# Patient Record
Sex: MALE | Race: WHITE | NOT HISPANIC OR LATINO | Employment: FULL TIME | URBAN - METROPOLITAN AREA
[De-identification: names, ages, dates, MRNs, and addresses within clinical notes are randomized per-mention and may not be internally consistent; named-entity substitution may affect disease eponyms.]

---

## 2018-06-04 ENCOUNTER — APPOINTMENT (OUTPATIENT)
Dept: PHYSICAL THERAPY | Facility: CLINIC | Age: 58
End: 2018-06-04
Payer: COMMERCIAL

## 2018-06-27 ENCOUNTER — OFFICE VISIT (OUTPATIENT)
Dept: PHYSICAL THERAPY | Facility: CLINIC | Age: 58
End: 2018-06-27
Payer: COMMERCIAL

## 2018-06-27 DIAGNOSIS — M25.511 RIGHT SHOULDER PAIN, UNSPECIFIED CHRONICITY: Primary | ICD-10-CM

## 2018-06-27 PROCEDURE — 97140 MANUAL THERAPY 1/> REGIONS: CPT | Performed by: PHYSICAL THERAPIST

## 2018-06-27 PROCEDURE — 97110 THERAPEUTIC EXERCISES: CPT | Performed by: PHYSICAL THERAPIST

## 2018-06-27 NOTE — PROGRESS NOTES
Daily Note     Today's date: 2018  Patient name: Rober Sparks  : 1960  MRN: [id number]  Referring provider: No ref  provider found  Dx:   Encounter Diagnosis     ICD-10-CM    1  Right shoulder pain, unspecified chronicity M25 511                   Subjective:  Tight at times but definitely better  5/10 pain greater posteriorly especially with outward reaching  Objective: See treatment diary below      Assessment: Tolerated treatment well  Patient would benefit from continued PT      Plan: Continue per plan of care       Precautions: none    Daily Treatment Diary     Manual              Manual with joint mobs 15'                                                                    Exercise Diary              Ball closed chain FF/CW/CCW 30x each            Wand ER 10"/10            Cybex EX             UBE 6'            Pulleys FF/ABD/IR 30x            Row Deltoid 45/30            bicep 45/30            tricep 60/30            Ball isometrics 30x                                                                                                                                                               Modalities              MH/Estim right shoulder 10'

## 2018-06-29 ENCOUNTER — APPOINTMENT (OUTPATIENT)
Dept: PHYSICAL THERAPY | Facility: CLINIC | Age: 58
End: 2018-06-29
Payer: COMMERCIAL

## 2018-07-02 ENCOUNTER — APPOINTMENT (OUTPATIENT)
Dept: PHYSICAL THERAPY | Facility: CLINIC | Age: 58
End: 2018-07-02
Payer: COMMERCIAL

## 2018-07-06 ENCOUNTER — OFFICE VISIT (OUTPATIENT)
Dept: PHYSICAL THERAPY | Facility: CLINIC | Age: 58
End: 2018-07-06
Payer: COMMERCIAL

## 2018-07-06 DIAGNOSIS — M25.511 RIGHT SHOULDER PAIN, UNSPECIFIED CHRONICITY: Primary | ICD-10-CM

## 2018-07-06 PROCEDURE — 97110 THERAPEUTIC EXERCISES: CPT

## 2018-07-06 PROCEDURE — 97140 MANUAL THERAPY 1/> REGIONS: CPT

## 2018-07-06 NOTE — PROGRESS NOTES
Daily Note     Today's date: 2018  Patient name: Caleb Sparks  : 1960  MRN: [id number]  Referring provider: Lucia Kimball MD  Dx:   Encounter Diagnosis     ICD-10-CM    1  Right shoulder pain, unspecified chronicity M25 511        Start Time: 705  Stop Time: 805  Total time in clinic (min): 60 minutes    Subjective: Patient reports having increased pain in the right shouldder  conts to have tightness at times, notes going for [facility] tomorrow  Objective: See treatment diary below      Assessment: Tolerated treatment well  Patient exhibited good technique with therapeutic exercises and would benefit from continued PT      Plan: Continue per plan of care  Progress note during next visit       Precautions: none    Daily Treatment Diary     Manual             Manual with joint mobs 15' 15                                                                   Exercise Diary             Ball closed chain FF/CW/CCW 30x each 30 each           Wand ER 10"/10 10"/10           Cybex EX             UBE            Pulleys FF/ABD/IR 30x 30x           Row Deltoid 45/30 45/30           bicep 45/30 45/30           tricep 60/30 60/30           Ball isometrics 30x 30x each                                                                                                                                                              Modalities             MH/Estim right shoulder 10

## 2018-07-08 NOTE — PROGRESS NOTES
PT Re-Evaluation     Today's date: 2018  Patient name: Felisa Chanel  : 1960  MRN: [id number]  Referring provider: Darryl Gunn MD  Dx:   Encounter Diagnosis     ICD-10-CM    1  Right shoulder pain, unspecified chronicity M25 511                   Assessment  Impairments: abnormal or restricted ROM, impaired physical strength and pain with function    Assessment details: Viridiana Inman has been seen in PT for 10 treatments over the course of two months  He has made positive gains with decreased pain intensity and frequency as well as increased ROM and functional mobility  Partial achievement of goals met  He could benefit from additional PT services to address above impairments and functional limitations  Understanding of Dx/Px/POC: good   Prognosis: good    Goals  STG/  1  < 5/10 pain in 3 weeks - progressing toward this goal  2  ROM: FF>150, ABD>150, ER>70,IR>45- progressing toward  LTG  1 3-4/5 strength in available ROM in 6 weeks - progressing  2  Sleep uninterupted by shoulder pain for >5 hours in 6 weeeks - progressing toward  3  Increased CARROLL shoulder 9 points in 6 weeks  - progressing toward   Continue with goals as outlined with projected goals to be met in 4-6 weeks        Plan  Patient would benefit from: skilled physical therapy  Planned modality interventions: thermotherapy: hydrocollator packs and unattended electrical stimulation  Planned therapy interventions: manual therapy, joint mobilization, neuromuscular re-education, patient education, strengthening, stretching, therapeutic activities, therapeutic exercise and home exercise program  Duration in visits: 2  Duration in weeks: 6  Plan of Care beginning date: 2018  Plan of Care expiration date: 2018  Treatment plan discussed with: patient        Subjective Evaluation    History of Present Illness  Mechanism of injury: Patient notes positive gains with PT to date with increased ROM, decreased pain, and increased use of right upper extremity  Had MRI this weekend and will see MD in four days for review of results  Quality of life: good    Pain  Current pain ratin  At best pain ratin  At worst pain ratin  Pain location: generalized right shoulder and upper arm  Quality: discomfort and dull ache  Relieving factors: change in position, heat and ice  Exacerbated by: sleeping  Progression: improved    Patient Goals  Patient goals for therapy: increased strength, independence with ADLs/IADLs, return to sport/leisure activities and increased motion          Objective     Active Range of Motion     Right Shoulder   Flexion: 135 degrees   Abduction: 125 degrees   External rotation 45°: 45 degrees   Internal rotation 45°: 45 degrees with pain    Passive Range of Motion     Right Shoulder   Flexion: 158 degrees   Abduction: 140 degrees   External rotation 90°: 55 degrees   Internal rotation 90°: 45 degrees     Scapular Mobility     Right Shoulder   Scapular mobility: fair    Strength/Myotome Testing     Right Shoulder     Planes of Motion   Flexion: 4-   Abduction: 4-   External rotation at 0°: 3+   Internal rotation at 0°: 4-     Tests     Additional Tests Details  [place] Shoulder score:48/60   Was 45/64  + gains in functional mobility      Flowsheet Rows      Most Recent Value   PT/OT G-Codes   Assessment Type  Re-evaluation   G code set  Carrying, Moving & Handling Objects   Carrying, Moving and Handling Objects Current Status ()  CJ   Carrying, Moving and Handling Objects Goal Status ()  CI          Precautions: standard    Daily Treatment Diary     Manual            Manual with joint mobs 15' 15 15                       b                                           Exercise Diary            Ball closed chain FF/CW/CCW 30x each 30 each 30x          Wand ER 10"/10 10"/10 nt          Cybex EX             UBE 6' 6' 6'          Pulleys FF/ABD/IR 30x 30x 30x          Row Deltoid 45/30 45/30 45/30          bicep 45/30 45/30 45/30          tricep 60/30 60/30 60/30          Ball isometrics 30x 30x each D/C          RTC exercise   Blue  30x each                                                                                                                                                Modalities  6/27 7/6           MH/Estim right shoulder 10'

## 2018-07-09 ENCOUNTER — APPOINTMENT (OUTPATIENT)
Dept: PHYSICAL THERAPY | Facility: CLINIC | Age: 58
End: 2018-07-09
Payer: COMMERCIAL

## 2018-07-09 ENCOUNTER — TRANSCRIBE ORDERS (OUTPATIENT)
Dept: PHYSICAL THERAPY | Facility: CLINIC | Age: 58
End: 2018-07-09

## 2018-07-09 ENCOUNTER — EVALUATION (OUTPATIENT)
Dept: PHYSICAL THERAPY | Facility: CLINIC | Age: 58
End: 2018-07-09
Payer: COMMERCIAL

## 2018-07-09 DIAGNOSIS — M25.511 RIGHT SHOULDER PAIN, UNSPECIFIED CHRONICITY: Primary | ICD-10-CM

## 2018-07-09 PROCEDURE — 97110 THERAPEUTIC EXERCISES: CPT | Performed by: PHYSICAL THERAPIST

## 2018-07-09 PROCEDURE — G8985 CARRY GOAL STATUS: HCPCS | Performed by: PHYSICAL THERAPIST

## 2018-07-09 PROCEDURE — G8984 CARRY CURRENT STATUS: HCPCS | Performed by: PHYSICAL THERAPIST

## 2018-07-09 PROCEDURE — 97140 MANUAL THERAPY 1/> REGIONS: CPT | Performed by: PHYSICAL THERAPIST

## 2018-07-09 NOTE — LETTER
September 10, 2018    Dayana Larson MD  [address] [place] [address]    Patient: Julian Sparks   YOB: 1960   Date of Visit: 2018     Encounter Diagnosis     ICD-10-CM    1  Right shoulder pain, unspecified chronicity M25 511        Dear Dr Lucero Chavez:    Please review the attached Plan of Care from [facility] recent visit  Please verify that you agree therapy should continue by signing the attached document and sending it back to our office  If you have any questions or concerns, please don't hesitate to call  Sincerely,    Barbara Siddiqi, PT      Referring Provider:      I certify that I have read the below Plan of Care and certify the need for these services furnished under this plan of treatment while under my care  Dayana Larson MD  [address] [facility]: [phone]          PT Re-Evaluation     Today's date: 2018  Patient name: Danita Torres  : 1960  MRN: [id number]  Referring provider: Cuba Ambriz MD  Dx:   Encounter Diagnosis     ICD-10-CM    1  Right shoulder pain, unspecified chronicity M25 511                   Assessment  Impairments: abnormal or restricted ROM, impaired physical strength and pain with function    Assessment details: Khushi Fuller has been seen in PT for 10 treatments over the course of two months  He has made positive gains with decreased pain intensity and frequency as well as increased ROM and functional mobility  Partial achievement of goals met  He could benefit from additional PT services to address above impairments and functional limitations  Understanding of Dx/Px/POC: good   Prognosis: good    Goals  STG/  1  < 5/10 pain in 3 weeks - progressing toward this goal  2  ROM: FF>150, ABD>150, ER>70,IR>45- progressing toward  LTG  1 3-4/5 strength in available ROM in 6 weeks - progressing  2   Sleep uninterupted by shoulder pain for >5 hours in 6 weeeks - progressing toward  3  Increased CARROLL shoulder 9 points in 6 weeks  - progressing toward   Continue with goals as outlined with projected goals to be met in 4-6 weeks  Plan  Patient would benefit from: skilled physical therapy  Planned modality interventions: thermotherapy: hydrocollator packs and unattended electrical stimulation  Planned therapy interventions: manual therapy, joint mobilization, neuromuscular re-education, patient education, strengthening, stretching, therapeutic activities, therapeutic exercise and home exercise program  Duration in visits: 2  Duration in weeks: 6  Plan of Care beginning date: 2018  Plan of Care expiration date: 2018  Treatment plan discussed with: patient        Subjective Evaluation    History of Present Illness  Mechanism of injury: Patient notes positive gains with PT to date with increased ROM, decreased pain, and increased use of right upper extremity  Had MRI this weekend and will see MD in four days for review of results  Quality of life: good    Pain  Current pain ratin  At best pain ratin  At worst pain ratin  Pain location: generalized right shoulder and upper arm  Quality: discomfort and dull ache  Relieving factors: change in position, heat and ice  Exacerbated by: sleeping    Progression: improved    Patient Goals  Patient goals for therapy: increased strength, independence with ADLs/IADLs, return to sport/leisure activities and increased motion          Objective     Active Range of Motion     Right Shoulder   Flexion: 135 degrees   Abduction: 125 degrees   External rotation 45°:  45 degrees   Internal rotation 45°:  45 degrees with pain    Passive Range of Motion     Right Shoulder   Flexion: 158 degrees   Abduction: 140 degrees   External rotation 90°:  55 degrees   Internal rotation 90°:  45 degrees     Scapular Mobility     Right Shoulder   Scapular mobility: fair    Strength/Myotome Testing     Right Shoulder     Planes of Motion   Flexion: 4- Abduction: 4-   External rotation at 0°:  3+   Internal rotation at 0°:  4-     Tests     Additional Tests Details  [place] Shoulder score:48/60   Was 45/64  + gains in functional mobility      Flowsheet Rows      Most Recent Value   PT/OT G-Codes   Assessment Type  Re-evaluation   G code set  Carrying, Moving & Handling Objects   Carrying, Moving and Handling Objects Current Status ()  CJ   Carrying, Moving and Handling Objects Goal Status ()  CI          Precautions: standard    Daily Treatment Diary     Manual  6/27 7/6 7/9          Manual with joint mobs 15' 15 15                       b                                           Exercise Diary  6/27 7/6 7/9          Ball closed chain FF/CW/CCW 30x each 30 each 30x          Wand ER 10"/10 10"/10 nt          Cybex EX             UBE 6' 6' 6'          Pulleys FF/ABD/IR 30x 30x 30x          Row Deltoid 45/30 45/30 45/30          bicep 45/30 45/30 45/30          tricep 60/30 60/30 60/30          Ball isometrics 30x 30x each D/C          RTC exercise   Blue  30x each                                                                                                                                                Modalities  6/27 7/6           MH/Estim right shoulder 10'

## 2018-07-09 NOTE — LETTER
2018    MD Lizzie Ag [address]  [address] [facility] [address]    Patient: Lorelei Sparks   YOB: 1960   Date of Visit: 2018     Encounter Diagnosis     ICD-10-CM    1  Right shoulder pain, unspecified chronicity M25 511        Dear Dr Braden Posada:    Please review the attached Plan of Care from [facility] recent visit  Please verify that you agree therapy should continue by signing the attached document and sending it back to our office  If you have any questions or concerns, please don't hesitate to call  Sincerely,    Maye Apgar, PT      Referring Provider:      I certify that I have read the below Plan of Care and certify the need for these services furnished under this plan of treatment while under my care  MD Lizzie [address]  [facility] [place]: [phone]          PT Re-Evaluation     Today's date: 2018  Patient name: Collette Lords  : 1960  MRN: [id number]  Referring provider: [place]ia Alegria MD  Dx:   Encounter Diagnosis     ICD-10-CM    1  Right shoulder pain, unspecified chronicity M25 511                   Assessment  Impairments: abnormal or restricted ROM, impaired physical strength and pain with function    Assessment details: Travon Mathur has been seen in PT for 10 treatments over the course of two months  He has made positive gains with decreased pain intensity and frequency as well as increased ROM and functional mobility  Partial achievement of goals met  He could benefit from additional PT services to address above impairments and functional limitations  Understanding of Dx/Px/POC: good   Prognosis: good    Goals  STG/  1  < 5/10 pain in 3 weeks - progressing toward this goal  2  ROM: FF>150, ABD>150, ER>70,IR>45- progressing toward  LTG  1 3-4/5 strength in available ROM in 6 weeks - progressing  2   Sleep uninterupted by shoulder pain for >5 hours in 6 weeeks - progressing toward  3  Increased CARROLL shoulder 9 points in 6 weeks  - progressing toward   Continue with goals as outlined with projected goals to be met in 4-6 weeks  Plan  Patient would benefit from: skilled physical therapy  Planned modality interventions: thermotherapy: hydrocollator packs and unattended electrical stimulation  Planned therapy interventions: manual therapy, joint mobilization, neuromuscular re-education, patient education, strengthening, stretching, therapeutic activities, therapeutic exercise and home exercise program  Duration in visits: 2  Duration in weeks: 6  Plan of Care beginning date: 2018  Plan of Care expiration date: 2018  Treatment plan discussed with: patient        Subjective Evaluation    History of Present Illness  Mechanism of injury: Patient notes positive gains with PT to date with increased ROM, decreased pain, and increased use of right upper extremity  Had MRI this weekend and will see MD in four days for review of results  Quality of life: good    Pain  Current pain ratin  At best pain ratin  At worst pain ratin  Pain location: generalized right shoulder and upper arm  Quality: discomfort and dull ache  Relieving factors: change in position, heat and ice  Exacerbated by: sleeping    Progression: improved    Patient Goals  Patient goals for therapy: increased strength, independence with ADLs/IADLs, return to sport/leisure activities and increased motion          Objective     Active Range of Motion     Right Shoulder   Flexion: 135 degrees   Abduction: 125 degrees   External rotation 45°:  45 degrees   Internal rotation 45°:  45 degrees with pain    Passive Range of Motion     Right Shoulder   Flexion: 158 degrees   Abduction: 140 degrees   External rotation 90°:  55 degrees   Internal rotation 90°:  45 degrees     Scapular Mobility     Right Shoulder   Scapular mobility: fair    Strength/Myotome Testing     Right Shoulder     Planes of Motion   Flexion: 4- Abduction: 4-   External rotation at 0°:  3+   Internal rotation at 0°:  4-     Tests     Additional Tests Details  Shakir Shoulder score:48/60   Was 45/64  + gains in functional mobility      Flowsheet Rows      Most Recent Value   PT/OT G-Codes   Assessment Type  Re-evaluation   G code set  Carrying, Moving & Handling Objects   Carrying, Moving and Handling Objects Current Status ()  CJ   Carrying, Moving and Handling Objects Goal Status ()  CI          Precautions: standard    Daily Treatment Diary     Manual  6/27 7/6 7/9          Manual with joint mobs 15' 15 15                       b                                           Exercise Diary  6/27 7/6 7/9          Ball closed chain FF/CW/CCW 30x each 30 each 30x          Wand ER 10"/10 10"/10 nt          Cybex EX             UBE 6' 6' 6'          Pulleys FF/ABD/IR 30x 30x 30x          Row Deltoid 45/30 45/30 45/30          bicep 45/30 45/30 45/30          tricep 60/30 60/30 60/30          Ball isometrics 30x 30x each D/C          RTC exercise   Blue  30x each                                                                                                                                                Modalities  6/27 7/6           MH/Estim right shoulder 10'

## 2018-07-13 ENCOUNTER — APPOINTMENT (OUTPATIENT)
Dept: PHYSICAL THERAPY | Facility: CLINIC | Age: 58
End: 2018-07-13
Payer: COMMERCIAL

## 2018-07-13 ENCOUNTER — OFFICE VISIT (OUTPATIENT)
Dept: PHYSICAL THERAPY | Facility: CLINIC | Age: 58
End: 2018-07-13
Payer: COMMERCIAL

## 2018-07-13 DIAGNOSIS — M25.511 RIGHT SHOULDER PAIN, UNSPECIFIED CHRONICITY: Primary | ICD-10-CM

## 2018-07-13 PROCEDURE — 97110 THERAPEUTIC EXERCISES: CPT

## 2018-07-13 PROCEDURE — 97014 ELECTRIC STIMULATION THERAPY: CPT

## 2018-07-13 PROCEDURE — G0283 ELEC STIM OTHER THAN WOUND: HCPCS

## 2018-07-13 PROCEDURE — 97140 MANUAL THERAPY 1/> REGIONS: CPT

## 2018-07-13 NOTE — PROGRESS NOTES
Daily Note     Today's date: 2018  Patient name: Franko Yates  : 1960  MRN: [id number]  Referring provider: Tessa Nielsen MD  Dx:   Encounter Diagnosis     ICD-10-CM    1  Right shoulder pain, unspecified chronicity M25 511        Start Time: 705  Stop Time: 805  Total time in clinic (min): 60 minutes    Subjective: patient reports a little sore last night and today due increased activities at home building a shed  Notes he sees MD later today  Objective: See treatment diary below      Assessment: Tolerated treatment well, still pain and tightness right shoulder all planes luli FF/ER Patient would benefit from continued PT      Plan: Continue per plan of care  Progress treatment as tolerated          Precautions: standard    Daily Treatment Diary     Manual           Manual with joint mobs 15' 15 15 15                      b                                           Exercise Diary           Ball closed chain FF/CW/CCW 30x each 30 each 30x 30         Wand ER 10"/10 10"/10 nt 10"/10         Cybex EX             UBE 6 6' 6         Pulleys FF/ABD/IR 30x 30x 30x 30         Row Deltoid 45/30 45/30 45/30 45/30         bicep 45/30 45/30 45/30 45/30         tricep 60/30 60/30 60/30 60/30         Ball isometrics 30x 30x each D/C          RTC exercise   Blue  30x each 30x each                                                                                                                                               Modalities           MH/Estim right shoulder 10  10 10

## 2018-07-15 NOTE — PROGRESS NOTES
Daily Note     Today's date: 7/15/2018  Patient name: Li Richardson  : 1960  MRN: [id number]  Referring provider: Estrada Ricketts MD  Dx:   Encounter Diagnosis     ICD-10-CM    1  Right shoulder pain, unspecified chronicity M25 511                   Subjective: Patient notes he was able to complete a home project of building a shed this weekend with no significant increase in shoulder pain  Pain 3-4/10 today  Saw MD Friday who advised continue PT  Patient notes MRI was positive for a small RTC tear but surgery not warranted at this time per patient  Objective: See treatment diary below      Assessment: Tolerated treatment well  Patient would benefit from continued PT  Positive scapular tightness apparent at end ranges of flexion and abduction but improving  Pain end range external rotation  Plan: Continue per plan of care      Precautions: standard    Daily Treatment Diary     Manual          Manual with joint mobs 15' 15 15 15 15'                                                                Exercise Diary          Ball closed chain FF/CW/CCW 30x each 30 each 30x 30 30x        Wand ER 10"/10 10"/10 nt 10"/10 10"/10        Cybex EX             UBE 6' 6' 6' 6' 6'        Pulleys FF/ABD/IR 30x 30x 30x 30 30x        Row Deltoid 45/30 45/30 45/30 45/30 45/30        bicep 45/30 45/30 45/30 45/30 45/30        tricep 60/30 60/30 60/30 60/30 60/30        Ball isometrics 30x 30x each D/C          RTC exercise   Blue  30x each 30x each Blue  30x each                                                                                                                                              Modalities          MH/Estim right shoulder 10'  10' 10 10'

## 2018-07-16 ENCOUNTER — OFFICE VISIT (OUTPATIENT)
Dept: PHYSICAL THERAPY | Facility: CLINIC | Age: 58
End: 2018-07-16
Payer: COMMERCIAL

## 2018-07-16 DIAGNOSIS — M25.511 RIGHT SHOULDER PAIN, UNSPECIFIED CHRONICITY: Primary | ICD-10-CM

## 2018-07-16 PROCEDURE — 97140 MANUAL THERAPY 1/> REGIONS: CPT | Performed by: PHYSICAL THERAPIST

## 2018-07-16 PROCEDURE — 97014 ELECTRIC STIMULATION THERAPY: CPT | Performed by: PHYSICAL THERAPIST

## 2018-07-16 PROCEDURE — G0283 ELEC STIM OTHER THAN WOUND: HCPCS | Performed by: PHYSICAL THERAPIST

## 2018-07-16 PROCEDURE — 97110 THERAPEUTIC EXERCISES: CPT | Performed by: PHYSICAL THERAPIST

## 2018-07-20 ENCOUNTER — OFFICE VISIT (OUTPATIENT)
Dept: PHYSICAL THERAPY | Facility: CLINIC | Age: 58
End: 2018-07-20
Payer: COMMERCIAL

## 2018-07-20 DIAGNOSIS — M25.511 RIGHT SHOULDER PAIN, UNSPECIFIED CHRONICITY: Primary | ICD-10-CM

## 2018-07-20 PROCEDURE — 97140 MANUAL THERAPY 1/> REGIONS: CPT | Performed by: PHYSICAL THERAPIST

## 2018-07-20 PROCEDURE — G0283 ELEC STIM OTHER THAN WOUND: HCPCS | Performed by: PHYSICAL THERAPIST

## 2018-07-20 PROCEDURE — 97014 ELECTRIC STIMULATION THERAPY: CPT | Performed by: PHYSICAL THERAPIST

## 2018-07-20 PROCEDURE — 97110 THERAPEUTIC EXERCISES: CPT | Performed by: PHYSICAL THERAPIST

## 2018-07-20 NOTE — PROGRESS NOTES
Daily Note     Today's date: 2018  Patient name: Jenna Sánchez  : 1960  MRN: [id number]  Referring provider: Konstantin Garcia MD  Dx:   Encounter Diagnosis     ICD-10-CM    1  Right shoulder pain, unspecified chronicity M25 511        Start Time: 0700  Stop Time: 0803  Total time in clinic (min): 63 minutes    Subjective: Pt reports pain 3/10  Objective: See treatment diary below  PROM:      ER 61  IR 48    Assessment: ROM is improving but continues with end range limitations  Apley's ER/IR no WFL  Tolerated treatment well  Patient would benefit from continued PT  Plan: Continue per plan of care  3 visits remaining      Precautions: standard    Daily Treatment Diary     Manual         Manual with joint mobs 15' 15 15 15 15' 15                                                               Exercise Diary         Ball closed chain FF/CW/CCW 30x each 30 each 30x 30 30x 30x       Wand ER 10"/10 10"/10 nt 10"/10 10"/10 10"/10       Cybex EX             UBE 6' 6' 6' 6' 6' 7'       Pulleys FF/ABD/IR 30x 30x 30x 30 30x 30x       Row Deltoid 45/30 45/30 45/30 45/30 45/30 50#/ 30       bicep 45/30 45/30 45/30 45/30 45/30 45#/ 30       tricep 60/30 60/30 60/30 60/30 60/30 60#/ 30       Ball isometrics 30x 30x each D/C          RTC exercise   Blue  30x each 30x each Blue  30x each 30x                                                                                                                                             Modalities         MH/Estim right shoulder 10'  10' 10 10' 10'

## 2018-07-23 ENCOUNTER — OFFICE VISIT (OUTPATIENT)
Dept: PHYSICAL THERAPY | Facility: CLINIC | Age: 58
End: 2018-07-23
Payer: COMMERCIAL

## 2018-07-23 DIAGNOSIS — M25.511 RIGHT SHOULDER PAIN, UNSPECIFIED CHRONICITY: Primary | ICD-10-CM

## 2018-07-23 PROCEDURE — 97110 THERAPEUTIC EXERCISES: CPT | Performed by: PHYSICAL THERAPIST

## 2018-07-23 PROCEDURE — G0283 ELEC STIM OTHER THAN WOUND: HCPCS | Performed by: PHYSICAL THERAPIST

## 2018-07-23 PROCEDURE — 97140 MANUAL THERAPY 1/> REGIONS: CPT | Performed by: PHYSICAL THERAPIST

## 2018-07-23 PROCEDURE — 97014 ELECTRIC STIMULATION THERAPY: CPT | Performed by: PHYSICAL THERAPIST

## 2018-07-23 NOTE — PROGRESS NOTES
Daily Note     Today's date: 2018  Patient name: Kimberley Guaman  : 1960  MRN: [id number]  Referring provider: Mac Zavala MD  Dx:   Encounter Diagnosis     ICD-10-CM    1  Right shoulder pain, unspecified chronicity M25 511        Start Time: 148  Stop Time: 075  Total time in clinic (min): 63 minutes    Subjective: Pain reported as 2/10  Objective: See treatment diary below      Assessment: Tolerated treatment well  Patient would benefit from continued PT to maximize ROM and strength  Plan: Continue per plan of care         Precautions: standard    Daily Treatment Diary     Manual        Manual with joint mobs 15' 15 15 15 15' 15 15'                                                              Exercise Diary        Ball closed chain FF/CW/CCW 30x each 30 each 30x 30 30x 30x 30x      Wand ER 10"/10 10"/10 nt 10"/10 10"/10 10"/10 10"/10      Cybex EX             UBE 6' 6' 6' 6' '       Pulleys FF/ABD/IR 30x 30x 30x 30 30x 30x 30x      Row Deltoid 45/30 45/30 45/30 45/30 45/30 50#/ 30 50#/ 30      bicep 45/30 45/30 45/30 45/30 45/30 45#/ 30 45#/ 30      tricep 60/30 60/30 60/30 60/30 60/30 60#/ 30 65# /30      Ball isometrics 30x 30x each D/C          RTC exercise   Blue  30x each 30x each Blue  30x each 30x 30x      row       50#/ 30                                                                                                                               Modalities        MH/Estim right shoulder 10'  10' 10 10' 10' 10'

## 2018-07-27 ENCOUNTER — OFFICE VISIT (OUTPATIENT)
Dept: PHYSICAL THERAPY | Facility: CLINIC | Age: 58
End: 2018-07-27
Payer: COMMERCIAL

## 2018-07-27 DIAGNOSIS — M25.511 RIGHT SHOULDER PAIN, UNSPECIFIED CHRONICITY: Primary | ICD-10-CM

## 2018-07-27 PROCEDURE — G0283 ELEC STIM OTHER THAN WOUND: HCPCS | Performed by: PHYSICAL THERAPIST

## 2018-07-27 PROCEDURE — 97140 MANUAL THERAPY 1/> REGIONS: CPT | Performed by: PHYSICAL THERAPIST

## 2018-07-27 PROCEDURE — 97110 THERAPEUTIC EXERCISES: CPT | Performed by: PHYSICAL THERAPIST

## 2018-07-27 PROCEDURE — 97014 ELECTRIC STIMULATION THERAPY: CPT | Performed by: PHYSICAL THERAPIST

## 2018-07-27 NOTE — PROGRESS NOTES
Daily Note     Today's date: 2018  Patient name: Jenna Sánchez  : 1960  MRN: [id number]  Referring provider: Konstantin Garcia MD  Dx:   Encounter Diagnosis     ICD-10-CM    1  Right shoulder pain, unspecified chronicity M25 511        Start Time:   Stop Time: 758  Total time in clinic (min): 63 minutes    Subjective: Pt reports decreased pain overall 2-310  Overall "much better"  Objective: See treatment diary below      Assessment: Tolerated treatment well  Patient has increased ROM compared to IE, now functional in all planes  Plan: Potential discharge next visit        Precautions: standard    Daily Treatment Diary     Manual       Manual with joint mobs 15' 15 15 15 15' 15 15' 15'                                                             Exercise Diary       Ball closed chain FF/CW/CCW 30x each 30 each 30x 30 30x 30x 30x 30x     Wand ER 10"/10 10"/10 nt 10"/10 10"/10 10"/10 10"/10 10"/10     Cybex EX             UBE 6' 6' 6' 6' 6' 7' 7' 7'     Pulleys FF/ABD/IR 30x 30x 30x 30 30x 30x 30x 30x     Row Deltoid 45/30 45/30 45/30 45/30 45/30 50#/ 30 50#/ 30 50#/ 30     bicep 45/30 45/30 45/30 45/30 45/30 45#/ 30 45#/ 30 45#/ 30     tricep 60/30 60/30 60/30 60/30 60/30 60#/ 30 65# /30 65#/ 30     Ball isometrics 30x 30x each D/C          RTC exercise   Blue  30x each 30x each Blue  30x each 30x 30x 30x     row       50#/ 30 50# 30                                                                                                                              Modalities       MH/Estim right shoulder 10'  10' 10 10' 10' 10' 10'

## 2018-08-01 ENCOUNTER — OFFICE VISIT (OUTPATIENT)
Dept: PHYSICAL THERAPY | Facility: CLINIC | Age: 58
End: 2018-08-01
Payer: COMMERCIAL

## 2018-08-01 DIAGNOSIS — M25.511 RIGHT SHOULDER PAIN, UNSPECIFIED CHRONICITY: Primary | ICD-10-CM

## 2018-08-01 PROCEDURE — 97014 ELECTRIC STIMULATION THERAPY: CPT | Performed by: PHYSICAL THERAPIST

## 2018-08-01 PROCEDURE — G8985 CARRY GOAL STATUS: HCPCS | Performed by: PHYSICAL THERAPIST

## 2018-08-01 PROCEDURE — 97140 MANUAL THERAPY 1/> REGIONS: CPT | Performed by: PHYSICAL THERAPIST

## 2018-08-01 PROCEDURE — 97110 THERAPEUTIC EXERCISES: CPT | Performed by: PHYSICAL THERAPIST

## 2018-08-01 PROCEDURE — G0283 ELEC STIM OTHER THAN WOUND: HCPCS | Performed by: PHYSICAL THERAPIST

## 2018-08-01 PROCEDURE — G8986 CARRY D/C STATUS: HCPCS | Performed by: PHYSICAL THERAPIST

## 2018-08-01 NOTE — PROGRESS NOTES
Daily Note/Discharge    Today's date: 2018  Patient name: Billie Mortimer  : 1960  MRN: [id number]  Referring provider: Horacio Connolly MD  Dx:   Encounter Diagnosis     ICD-10-CM    1  Right shoulder pain, unspecified chronicity M25 511        Start Time: 620  Stop Time: 723  Total time in clinic (min): 63 minutes    Subjective: Decreased pain to 2/10  Objective: See treatment diary below  PROM:  FF: 160 (126 on IE)  ABD: 130 true plane   (108 on IE)  ER:58  (55 on IE)  IR:42 (26 @ 45 degrees POS on IE)  Strength 4/5 in all planes of available ROM  [place] Shoulder Score:51/60   (41/60)  Assessment: ROM improved markedly compared to IE  Strength improved  Function improved Decreased pain overall   Tolerated treatment well  Patient has met goals for pain, strength, and function  Plan: Discharge PT to [facility]  Pt to continue stretches at home      Precautions: standard    Daily Treatment Diary     Manual      Manual with joint mobs 15' 15 15 15 15' 15 15' 15' 15'                                                            Exercise Diary      Ball closed chain FF/CW/CCW 30x each 30 each 30x 30 30x 30x 30x 30x 30x    Wand ER 10"/10 10"/10 nt 10"/10 10"/10 10"/10 10"/10 10"/10 10"/10    Cybex EX             UBE 6' 6' 6' 6' 6' 7' 7' 7' 7'    Pulleys FF/ABD/IR 30x 30x 30x 30 30x 30x 30x 30x 30x    Row Deltoid 45/30 45/30 45/30 45/30 45/30 50#/ 30 50#/ 30 50#/ 30 50#/ 30    bicep 45/30 45/30 45/30 45/30 45/30 45#/ 30 45#/ 30 45#/ 30 45#/ 30    tricep 60/30 60/30 60/30 60/30 60/30 60#/ 30 65# /30 65#/ 30 65#/ 30    Ball isometrics 30x 30x each D/C          RTC exercise   Blue  30x each 30x each Blue  30x each 30x 30x 30x 30x    row       50#/ 30 50# 30 50#/ 30                                                                                                                             Modalities   7/23 7/27 8/1    /Bette right shoulder 10'  10' 10 10' 10' 10' 10' 10'

## 2018-09-10 ENCOUNTER — TRANSCRIBE ORDERS (OUTPATIENT)
Dept: PHYSICAL THERAPY | Facility: CLINIC | Age: 58
End: 2018-09-10

## 2018-09-10 DIAGNOSIS — M25.511 RIGHT SHOULDER PAIN, UNSPECIFIED CHRONICITY: Primary | ICD-10-CM

## 2019-03-21 ENCOUNTER — TELEPHONE (OUTPATIENT)
Dept: GASTROENTEROLOGY | Facility: CLINIC | Age: 59
End: 2019-03-21

## 2019-03-22 NOTE — TELEPHONE ENCOUNTER
03/22/19  Screened by: Jere Vanegas    Referring Provider     Pre- Screening: There is no height or weight on file to calculate BMI  Has patient been referred for a routine screening Colonoscopy? yes  Is the patient between 39-70 years old? yes    SCHEDULING STAFF   If the patient is between 45yrs-49yrs, please advise patient to confirm benefits/coverage with their insurance company for a routine screening colonoscopy, some insurance carriers will only cover at [phone] or older   If the patient is over 66years old, please schedule an office visit  Do you have any of the following symptoms? Heartburn    Have you had a coronary or vascular stent within the last year? no    Have you had a heart attack or stroke in the last 6 months? no    Have you had intestinal surgery in the last 3 months? no    Do you have problems with: NONE    Do you use: NONE    Have you been hospitalized in the last Month? no    Have you been diagnosed with a bleeding disorder or anemia? no    Have you had chest pain (angina) or breathing problems  (COPD) in the last 3 months? no    Do you have any difficulty walking up a flight of stairs? no    Have you had Kidney failure or insufficiency? no    Have you had heart valve surgery? no    Are you confined to a wheelchair? no    Do you take Other blood thinning medications yes    Have you been diagnosed with Diabetes or are you taking any   Diabetic medications? yes    : If patient answers NO to medical questions, then schedule procedure  If patient answers YES to medical questions, then schedule office appointment  Previous Colonoscopy yes  Date and Facility of last colonoscopy?      Comments: 4/11/19

## 2019-05-02 RX ORDER — LEVOTHYROXINE SODIUM 0.03 MG/1
25 TABLET ORAL EVERY MORNING
COMMUNITY

## 2019-05-02 RX ORDER — MONTELUKAST SODIUM 10 MG/1
10 TABLET ORAL EVERY MORNING
COMMUNITY

## 2019-05-02 RX ORDER — ATORVASTATIN CALCIUM 10 MG/1
10 TABLET, FILM COATED ORAL
COMMUNITY

## 2019-05-02 RX ORDER — POTASSIUM CHLORIDE 750 MG/1
10 CAPSULE, EXTENDED RELEASE ORAL 2 TIMES DAILY
COMMUNITY

## 2019-05-02 RX ORDER — ASPIRIN 81 MG/1
81 TABLET ORAL DAILY
COMMUNITY

## 2019-05-02 RX ORDER — AMLODIPINE BESYLATE 5 MG/1
5 TABLET ORAL EVERY MORNING
COMMUNITY

## 2019-05-02 RX ORDER — HYDROCHLOROTHIAZIDE 50 MG/1
50 TABLET ORAL
COMMUNITY

## 2019-05-02 RX ORDER — CETIRIZINE HYDROCHLORIDE 10 MG/1
10 TABLET ORAL
COMMUNITY

## 2019-05-08 ENCOUNTER — ANESTHESIA EVENT (OUTPATIENT)
Dept: PERIOP | Facility: HOSPITAL | Age: 59
End: 2019-05-08
Payer: COMMERCIAL

## 2019-05-09 ENCOUNTER — ANESTHESIA (OUTPATIENT)
Dept: PERIOP | Facility: HOSPITAL | Age: 59
End: 2019-05-09
Payer: COMMERCIAL

## 2019-05-09 ENCOUNTER — HOSPITAL ENCOUNTER (OUTPATIENT)
Facility: HOSPITAL | Age: 59
Setting detail: OUTPATIENT SURGERY
Discharge: HOME | End: 2019-05-09
Attending: INTERNAL MEDICINE | Admitting: INTERNAL MEDICINE
Payer: COMMERCIAL

## 2019-05-09 VITALS
BODY MASS INDEX: 25.18 KG/M2 | HEIGHT: 73 IN | DIASTOLIC BLOOD PRESSURE: 85 MMHG | WEIGHT: 190 LBS | RESPIRATION RATE: 18 BRPM | OXYGEN SATURATION: 96 % | SYSTOLIC BLOOD PRESSURE: 112 MMHG | TEMPERATURE: 97 F | HEART RATE: 83 BPM

## 2019-05-09 LAB — GLUCOSE SERPL-MCNC: 103 MG/DL (ref 65–140)

## 2019-05-09 PROCEDURE — 82948 REAGENT STRIP/BLOOD GLUCOSE: CPT

## 2019-05-09 RX ORDER — SODIUM CHLORIDE 9 MG/ML
125 INJECTION, SOLUTION INTRAVENOUS CONTINUOUS
Status: DISCONTINUED | OUTPATIENT
Start: 2019-05-09 | End: 2019-05-09 | Stop reason: HOSPADM

## 2019-05-09 RX ORDER — PROPOFOL 10 MG/ML
INJECTION, EMULSION INTRAVENOUS AS NEEDED
Status: DISCONTINUED | OUTPATIENT
Start: 2019-05-09 | End: 2019-05-09 | Stop reason: SURG

## 2019-05-09 RX ADMIN — PROPOFOL 50 MG: 10 INJECTION, EMULSION INTRAVENOUS at 10:25

## 2019-05-09 RX ADMIN — PROPOFOL 80 MG: 10 INJECTION, EMULSION INTRAVENOUS at 10:20

## 2019-05-09 RX ADMIN — PROPOFOL 30 MG: 10 INJECTION, EMULSION INTRAVENOUS at 10:30

## 2019-05-09 RX ADMIN — SODIUM CHLORIDE 125 ML/HR: 9 INJECTION, SOLUTION INTRAVENOUS at 08:30

## 2019-05-09 RX ADMIN — PROPOFOL 120 MG: 10 INJECTION, EMULSION INTRAVENOUS at 10:15

## 2019-07-08 ENCOUNTER — EVALUATION (OUTPATIENT)
Dept: PHYSICAL THERAPY | Age: 59
End: 2019-07-08
Payer: COMMERCIAL

## 2019-07-08 DIAGNOSIS — M25.512 ACUTE PAIN OF LEFT SHOULDER: Primary | ICD-10-CM

## 2019-07-08 PROCEDURE — 97140 MANUAL THERAPY 1/> REGIONS: CPT | Performed by: PHYSICAL THERAPIST

## 2019-07-08 PROCEDURE — 97014 ELECTRIC STIMULATION THERAPY: CPT | Performed by: PHYSICAL THERAPIST

## 2019-07-08 PROCEDURE — 97162 PT EVAL MOD COMPLEX 30 MIN: CPT | Performed by: PHYSICAL THERAPIST

## 2019-07-08 PROCEDURE — 97110 THERAPEUTIC EXERCISES: CPT | Performed by: PHYSICAL THERAPIST

## 2019-07-08 NOTE — LETTER
2019    Jennifer Bojorquez MD  [address] [place] [address]    Patient: Carolina Sparks   YOB: 1960   Date of Visit: 2019     Encounter Diagnosis     ICD-10-CM    1  Acute pain of left shoulder M25 512        Dear Dr iMlady Bustillo:    Please review the attached Plan of Care from [facility] recent visit  Please verify that you agree therapy should continue by signing the attached document and sending it back to our office  If you have any questions or concerns, please don't hesitate to call  Sincerely,    Linette Galvin PT      Referring Provider:      I certify that I have read the below Plan of Care and certify the need for these services furnished under this plan of treatment while under my care  Jennifer Bojorquez MD  [address] [place] [address] [facility]: [phone]          PT Evaluation     Today's date: 2019  Patient name: Billie Mortimer  : 1960  MRN: [id number]  Referring provider: Jennifer Bojorquez MD  Dx:   Encounter Diagnosis     ICD-10-CM    1  Acute pain of left shoulder M25 512        Start Time:   Stop Time:   Total time in clinic (min): 60 minutes    Assessment  Assessment details: Billie Mortimer is a 59 y o  male who presents with pain, decreased strength, decreased ROM and postural  dysfunction  Due to these impairments, Patient has difficulty performing a/iadls  Patient's clinical presentation is consistent with their referring diagnosis of left shoulder pain  Patient would benefit from skilled physical therapy to address their aforementioned impairments, improve their level of function and to improve their overall quality of life  Impairments: abnormal or restricted ROM, activity intolerance, impaired physical strength, lacks appropriate home exercise program, pain with function, poor posture  and poor body mechanics  Understanding of Dx/Px/POC: excellent  Goals  ST-3 WEEKS  1    Decrease pain left shoulder < 4/10 on VAS at its worst   2   Increase A& PROM left shoulder by > 5 deg in all deficients planes  3   Increase LUE strength > 4/5 RTC  LT-6 WEEKS  1  Patient to be independent with a/iadls  2  Increase functional activities for leisure and home activities to previous LOF  3  Independent with HEP and/or fitness program     Plan  Patient would benefit from: skilled physical therapy  Planned modality interventions: cryotherapy, electrical stimulation/Russian stimulation, thermotherapy: hydrocollator packs and unattended electrical stimulation  Planned therapy interventions: activity modification, body mechanics training, flexibility, functional ROM exercises, home exercise program, IADL retraining, joint mobilization, manual therapy, neuromuscular re-education, patient education, postural training, strengthening, stretching, therapeutic activities and therapeutic exercise  Frequency: 2-3x week  Duration in weeks: 12  Plan of Care beginning date: 2019  Plan of Care expiration date: 10/8/2019  Treatment plan discussed with: patient        Subjective Evaluation    History of Present Illness  Mechanism of injury: Patient started with left shoulder pain about 3 months ago, patient saw MD and referred to ortho  Patient was given an injection after xray was negative  Mild relief of pain since injection, ordered PT             Not a recurrent problem   Pain  Current pain ratin  At worst pain ratin  Location: left shoulder and upper arm  Quality: radiating, tight, sharp, pressure, dull ache and knife-like  Relieving factors: rest and change in position  Aggravating factors: lifting  Progression: improved    Social Support  Lives with: spouse    Employment status: working (programer)  Hand dominance: right      Diagnostic Tests  X-ray: normal  Treatments  Previous treatment: injection treatment  Patient Goals  Patient goals for therapy: decreased pain, increased motion, increased strength and independence with ADLs/IADLs          Objective     Static Posture     Head  Forward  Shoulders  Rounded  Observations     Additional Observation Details  Left pec appears larger than right  Gh Joints sit anterior from glenoid bilateral    Palpation   Left   Hypotonic in the teres major and teres minor  Muscle spasm in the supraspinatus       Cervical/Thoracic Screen   Cervical range of motion within normal limits with the following exceptions: [facility]    Neurological Testing     Sensation     Shoulder   Left Shoulder   Intact: light touch and hot/cold discrimination    Active Range of Motion   Left Shoulder   Flexion: 108 degrees   Abduction: 85 degrees     Right Shoulder   Flexion: 124 degrees   Abduction: 140 degrees     Passive Range of Motion   Left Shoulder   Flexion: 128 degrees   Abduction: 100 degrees   External rotation 45°:  50 degrees   Internal rotation 45°:  55 degrees     Right Shoulder   Flexion: 156 degrees   Abduction: 160 degrees   External rotation 90°:  70 degrees   Internal rotation 90°:  80 degrees     Strength/Myotome Testing     Left Shoulder     Planes of Motion   Flexion: 4   Abduction: 4   External rotation at 0°:  4-   Internal rotation at 0°:  4     Isolated Muscles   Supraspinatus: 4     Right Shoulder     Planes of Motion   Flexion: 4+   Abduction: 4+   External rotation at 0°:  4   Internal rotation at 0°:  4+     Isolated Muscles   Supraspinatus: 4       Flowsheet Rows      Most Recent Value   PT/OT G-Codes   Current Score  54 [left shoulder]   Projected Score  69   Assessment Type  Evaluation             Precautions: DM, HTN, hyopthyroid, prostate CA,    Modalities  7/8            MH/ES L  10                                         Manual  7/8            Left shoulder 15'                                                                    Exercise Diary  7/8            Ball nv            Cane FF 20x            Cane ER 10x                         Pulleys 30x            UBE nv            Ladder nv            RTC nt nv           rows nv                                                                                          HEP 10'*                                                                  *Patient instructed in HEP for passive stretches to all left shoulder motions in various positions

## 2019-07-08 NOTE — PROGRESS NOTES
PT Evaluation     Today's date: 2019  Patient name: Ann Ruano  : 1960  MRN: [id number]  Referring provider: Gaviota Reina MD  Dx:   Encounter Diagnosis     ICD-10-CM    1  Acute pain of left shoulder M25 512        Start Time:   Stop Time:   Total time in clinic (min): 60 minutes    Assessment  Assessment details: Ann Ruano is a 59 y o  male who presents with pain, decreased strength, decreased ROM and postural  dysfunction  Due to these impairments, Patient has difficulty performing a/iadls  Patient's clinical presentation is consistent with their referring diagnosis of left shoulder pain  Patient would benefit from skilled physical therapy to address their aforementioned impairments, improve their level of function and to improve their overall quality of life  Impairments: abnormal or restricted ROM, activity intolerance, impaired physical strength, lacks appropriate home exercise program, pain with function, poor posture  and poor body mechanics  Understanding of Dx/Px/POC: excellent  Goals  ST-3 WEEKS  1  Decrease pain left shoulder < 4/10 on VAS at its worst   2   Increase A& PROM left shoulder by > 5 deg in all deficients planes  3   Increase LUE strength > 4/5 RTC  LT-6 WEEKS  1  Patient to be independent with a/iadls  2  Increase functional activities for leisure and home activities to previous LOF    3  Independent with HEP and/or fitness program     Plan  Patient would benefit from: skilled physical therapy  Planned modality interventions: cryotherapy, electrical stimulation/Russian stimulation, thermotherapy: hydrocollator packs and unattended electrical stimulation  Planned therapy interventions: activity modification, body mechanics training, flexibility, functional ROM exercises, home exercise program, IADL retraining, joint mobilization, manual therapy, neuromuscular re-education, patient education, postural training, strengthening, stretching, therapeutic activities and therapeutic exercise  Frequency: 2-3x week  Duration in weeks: 12  Plan of Care beginning date: 2019  Plan of Care expiration date: 10/8/2019  Treatment plan discussed with: patient        Subjective Evaluation    History of Present Illness  Mechanism of injury: Patient started with left shoulder pain about 3 months ago, patient saw MD and referred to ortho  Patient was given an injection after xray was negative  Mild relief of pain since injection, ordered PT  Not a recurrent problem   Pain  Current pain ratin  At worst pain ratin  Location: left shoulder and upper arm  Quality: radiating, tight, sharp, pressure, dull ache and knife-like  Relieving factors: rest and change in position  Aggravating factors: lifting  Progression: improved    Social Support  Lives with: spouse    Employment status: working (programer)  Hand dominance: right      Diagnostic Tests  X-ray: normal  Treatments  Previous treatment: injection treatment  Patient Goals  Patient goals for therapy: decreased pain, increased motion, increased strength and independence with ADLs/IADLs          Objective     Static Posture     Head  Forward  Shoulders  Rounded  Observations     Additional Observation Details  Left pec appears larger than right  Gh Joints sit anterior from glenoid bilateral    Palpation   Left   Hypotonic in the teres major and teres minor  Muscle spasm in the supraspinatus       Cervical/Thoracic Screen   Cervical range of motion within normal limits with the following exceptions: [facility]    Neurological Testing     Sensation     Shoulder   Left Shoulder   Intact: light touch and hot/cold discrimination    Active Range of Motion   Left Shoulder   Flexion: 108 degrees   Abduction: 85 degrees     Right Shoulder   Flexion: 124 degrees   Abduction: 140 degrees     Passive Range of Motion   Left Shoulder   Flexion: 128 degrees   Abduction: 100 degrees   External rotation 45°: 50 degrees Internal rotation 45°: 55 degrees     Right Shoulder   Flexion: 156 degrees   Abduction: 160 degrees   External rotation 90°: 70 degrees   Internal rotation 90°: 80 degrees     Strength/Myotome Testing     Left Shoulder     Planes of Motion   Flexion: 4   Abduction: 4   External rotation at 0°: 4-   Internal rotation at 0°: 4     Isolated Muscles   Supraspinatus: 4     Right Shoulder     Planes of Motion   Flexion: 4+   Abduction: 4+   External rotation at 0°: 4   Internal rotation at 0°: 4+     Isolated Muscles   Supraspinatus: 4       Flowsheet Rows      Most Recent Value   PT/OT G-Codes   Current Score  54 [left shoulder]   Projected Score  69   Assessment Type  Evaluation             Precautions: DM, HTN, hyopthyroid, prostate CA,    Modalities  7/8            MH/ES L  10                                         Manual  7/8            Left shoulder 15'                                                                    Exercise Diary  7/8            Ball nv            Cane FF 20x            Cane ER 10x                         Pulleys 30x            UBE nv            Ladder nv            RTC nt nv           rows nv                                                                                          HEP 10'*                                                                  *Patient instructed in HEP for passive stretches to all left shoulder motions in various positions

## 2019-07-09 ENCOUNTER — TRANSCRIBE ORDERS (OUTPATIENT)
Dept: PHYSICAL THERAPY | Age: 59
End: 2019-07-09

## 2019-07-09 DIAGNOSIS — M25.512 ACUTE PAIN OF LEFT SHOULDER: Primary | ICD-10-CM

## 2019-07-10 ENCOUNTER — OFFICE VISIT (OUTPATIENT)
Dept: PHYSICAL THERAPY | Age: 59
End: 2019-07-10
Payer: COMMERCIAL

## 2019-07-10 DIAGNOSIS — M25.512 ACUTE PAIN OF LEFT SHOULDER: Primary | ICD-10-CM

## 2019-07-10 PROCEDURE — 97140 MANUAL THERAPY 1/> REGIONS: CPT

## 2019-07-10 PROCEDURE — 97110 THERAPEUTIC EXERCISES: CPT

## 2019-07-10 NOTE — PROGRESS NOTES
Daily Note     Today's date: 7/10/2019  Patient name: Bailey Millan  : 1960  MRN: [id number]  Referring provider: Sobeida Li MD  Dx:   Encounter Diagnosis     ICD-10-CM    1  Acute pain of left shoulder M25 512        Start Time:   Stop Time:   Total time in clinic (min): 50 minutes    Subjective: Patient reports that at rest it feels fine but overhead movements are quite painful  Objective: See treatment diary below  L shoulder flexion 130-135° this visit during ladder    Assessment: Patient would benefit from continued PT  Patient went through this last year on his R side and should have a good understanding of the current PT POC  Cole did a great job tolerating the increased pain required to improve ROM  Plan: Continue per plan of care  Precautions: DM, HTN, hyopthyroid, prostate CA,    Modalities  7/8 7/10           MH/ES L  10 10'                                        Manual  7/8 7/10           Left shoulder 15' 15'                                                                 Exercise Diary  7/8 7/10           Ball nv            Cane FF 20x 10"  x20           Cane ER 10x 10"  x15                        Pulleys 30x 30x           UBE nv Lvl 6  6 min           Ladder nv 10"  x20           RTC nt nv           rows nv                                                                                          HEP 10'*                                                                  *Patient instructed in HEP for passive stretches to all left shoulder motions in various positions

## 2019-07-15 ENCOUNTER — OFFICE VISIT (OUTPATIENT)
Dept: PHYSICAL THERAPY | Age: 59
End: 2019-07-15
Payer: COMMERCIAL

## 2019-07-15 DIAGNOSIS — M25.512 ACUTE PAIN OF LEFT SHOULDER: Primary | ICD-10-CM

## 2019-07-15 PROCEDURE — 97140 MANUAL THERAPY 1/> REGIONS: CPT | Performed by: PHYSICAL THERAPIST

## 2019-07-15 PROCEDURE — 97110 THERAPEUTIC EXERCISES: CPT | Performed by: PHYSICAL THERAPIST

## 2019-07-15 PROCEDURE — 97014 ELECTRIC STIMULATION THERAPY: CPT | Performed by: PHYSICAL THERAPIST

## 2019-07-15 NOTE — PROGRESS NOTES
Daily Note     Today's date: 7/15/2019  Patient name: Mirta Pandya  : 1960  MRN: [id number]  Referring provider: Jackson Maurice MD  Dx:   Encounter Diagnosis     ICD-10-CM    1  Acute pain of left shoulder M25 512        Start Time:   Stop Time:   Total time in clinic (min): 50 minutes    Subjective: Patient reports he is moving his arm a little farther with less pain  Getting his pulley for home tomorrow  Objective: See treatment diary below      Assessment: Tolerated treatment well  Patient would benefit from continued PT tightness end range all left shoulder motions with pain  PROM improving  Plan: Continue per plan of care  Precautions: DM, HTN, hyopthyroid, prostate CA,    Modalities  7/8 7/10 7/15          MH/ES L  10 10' 10                                       Manual  7/8 7/10 7/15          Left shoulder 15' 15' 15'                                                                Exercise Diary  7/8 7/10 7/15          Ball, FF circles nv  30x ea          Cane FF 20x 10"  x20 10" 20x          Cane ER 10x 10"  x15 10"  20x                       Pulleys 30x 30x 30x          UBE nv Lvl 6  6 min 4'          Ladder nv 10"  x20 10" 20x          RTC nt nv nv          rows nv  50# 30x                                                                                        HEP 10'*                                                                  *Patient instructed in HEP for passive stretches to all left shoulder motions in various positions

## 2019-07-17 ENCOUNTER — OFFICE VISIT (OUTPATIENT)
Dept: PHYSICAL THERAPY | Age: 59
End: 2019-07-17
Payer: COMMERCIAL

## 2019-07-17 DIAGNOSIS — M25.512 ACUTE PAIN OF LEFT SHOULDER: Primary | ICD-10-CM

## 2019-07-17 PROCEDURE — 97140 MANUAL THERAPY 1/> REGIONS: CPT

## 2019-07-17 PROCEDURE — 97014 ELECTRIC STIMULATION THERAPY: CPT

## 2019-07-17 PROCEDURE — 97110 THERAPEUTIC EXERCISES: CPT

## 2019-07-17 NOTE — PROGRESS NOTES
Daily Note     Today's date: 2019  Patient name: Bailey Millan  : 1960  MRN: [id number]  Referring provider: Sobeida Li MD  Dx:   Encounter Diagnosis     ICD-10-CM    1  Acute pain of left shoulder M25 512        Start Time: 1800  Stop Time: 1900  Total time in clinic (min): 60 minutes    Subjective: Patient reports no c/o pain at rest, notes pain wit sudden movements or reaching for something, pain radiates down arm to wrist        Objective: See treatment diary below      Assessment: Tolerated treatment well, tightness and pain with end ranges all planes  Added RTC exercises today with good tolerance  Patient exhibited good technique with therapeutic exercises and would benefit from continued PT      Plan: Continue per plan of care  Precautions: DM, HTN, hyopthyroid, prostate CA,    Modalities  7/8 7/10 7/15 7/17         MH/ES L  10 10' 10 10                                      Manual  7/8 7/10 7/15 7/17         Left shoulder 15' 15' 15' 15'                                                               Exercise Diary  7/8 7/10 7/15 7/17         Ball, FF circles nv  30x ea 30         Cane FF 20x 10"  x20 10" 20x 10"x20         Cane ER 10x 10"  x15 10"  20x 10"x20                      Pulleys 30x 30x 30x 30         UBE nv Lvl 6  6 min 4' 4'         Ladder nv 10"  x20 10" 20x 10"  20x         RTC nt nv nv 2x10 red TB          rows nv  50# 30x 50/30                                                                                       HEP 10'*                                                                  *Patient instructed in HEP for passive stretches to all left shoulder motions in various positions

## 2019-07-22 ENCOUNTER — OFFICE VISIT (OUTPATIENT)
Dept: PHYSICAL THERAPY | Age: 59
End: 2019-07-22
Payer: COMMERCIAL

## 2019-07-22 DIAGNOSIS — M25.512 ACUTE PAIN OF LEFT SHOULDER: Primary | ICD-10-CM

## 2019-07-22 PROCEDURE — 97014 ELECTRIC STIMULATION THERAPY: CPT | Performed by: PHYSICAL THERAPIST

## 2019-07-22 PROCEDURE — 97110 THERAPEUTIC EXERCISES: CPT | Performed by: PHYSICAL THERAPIST

## 2019-07-22 PROCEDURE — 97140 MANUAL THERAPY 1/> REGIONS: CPT | Performed by: PHYSICAL THERAPIST

## 2019-07-22 NOTE — PROGRESS NOTES
Daily Note     Today's date: 2019  Patient name: Lena Benito  : 1960  MRN: [id number]  Referring provider: Ashkan Archibald MD  Dx:   Encounter Diagnosis     ICD-10-CM    1  Acute pain of left shoulder M25 512        Start Time: 1800  Stop Time: 1900  Total time in clinic (min): 60 minutes    Subjective: Patient reports he is stiff today as he didn't do much all weekend  Objective: See treatment diary below      Assessment: Tolerated treatment well  Patient would benefit from continued PT significant tightness at end ranges all shoulder motions  Patient advised to stretch more frequently  Plan: Continue per plan of care  Precautions: DM, HTN, hyopthyroid, prostate CA,    Modalities  7/8 7/10 7/15 7/17 7/22        MH/ES L  10 10' 10 10 10                                     Manual  7/8 7/10 7/15 7/17 7/22        Left shoulder 15' 15' 15' 15' 15                                                              Exercise Diary  7/8 7/10 7/15 7/17 7/22        Ball, FF circles nv  30x ea 30 30x        Cane FF 20x 10"  x20 10" 20x 10"x20 2# 30x        Cane ER 10x 10"  x15 10"  20x 10"x20 nt                     Pulleys 30x 30x 30x 30 30x        UBE nv Lvl 6  6 min 4' 4' 4'        Ladder nv 10"  x20 10" 20x 10"  20x 20x        RTC nt nv nv 2x10 red TB  20x red blue       rows nv  50# 30x 50/30 50# 30x                                                                                      HEP 10'*                                                                  *Patient instructed in HEP for passive stretches to all left shoulder motions in various positions

## 2019-07-24 ENCOUNTER — OFFICE VISIT (OUTPATIENT)
Dept: PHYSICAL THERAPY | Age: 59
End: 2019-07-24
Payer: COMMERCIAL

## 2019-07-24 DIAGNOSIS — M25.512 ACUTE PAIN OF LEFT SHOULDER: Primary | ICD-10-CM

## 2019-07-24 PROCEDURE — 97140 MANUAL THERAPY 1/> REGIONS: CPT

## 2019-07-24 PROCEDURE — 97110 THERAPEUTIC EXERCISES: CPT

## 2019-07-24 PROCEDURE — 97014 ELECTRIC STIMULATION THERAPY: CPT

## 2019-07-24 NOTE — PROGRESS NOTES
Daily Note     Today's date: 2019  Patient name: Billie Mortimer  : 1960  MRN: [id number]  Referring provider: Haroldo Guajardo MD  Dx:   Encounter Diagnosis     ICD-10-CM    1  Acute pain of left shoulder M25 512        Start Time: 1805  Stop Time: 1905  Total time in clinic (min): 60 minutes    Subjective:Patient reports sore after last visit  Objective: See treatment diary below      Assessment: Tolerated treatment fairly well, increased stiffness/tightness end ranges all planes  Patient demonstrated fatigue post treatment and would benefit from continued PT      Plan: Continue per plan of care  Precautions: DM, HTN, hyopthyroid, prostate CA,    Modalities  7/8 7/10 7/15 7/17 7/22 7/24       MH/ES L  10 10' 10 10 10 10                                    Manual  7/8 7/10 7/15 7/17 7/22 7/24       Left shoulder 15' 15' 15' 15' 15 15                                                             Exercise Diary  7/8 7/10 7/15 7/17 7/22 7/24       Ball, FF circles nv  30x ea 30 30x 30x       Cane FF 20x 10"  x20 10" 20x 10"x20 2# 30x 2#/30x       Cane ER 10x 10"  x15 10"  20x 10"x20 nt nt                    Pulleys 30x 30x 30x 30 30x 30x       UBE nv Lvl 6  6 min 4' 4' 4' 5'       Ladder nv 10"  x20 10" 20x 10"  20x 20x 20x       RTC nt nv nv 2x10 red TB  20x red Blue  20x       rows nv  50# 30x 50/30 50# 30x 50#  30x                                                                                     HEP 10'*                                                                  *Patient instructed in HEP for passive stretches to all left shoulder motions in various positions

## 2019-07-29 ENCOUNTER — APPOINTMENT (OUTPATIENT)
Dept: PHYSICAL THERAPY | Age: 59
End: 2019-07-29
Payer: COMMERCIAL

## 2019-07-31 ENCOUNTER — OFFICE VISIT (OUTPATIENT)
Dept: PHYSICAL THERAPY | Age: 59
End: 2019-07-31
Payer: COMMERCIAL

## 2019-07-31 DIAGNOSIS — M25.512 ACUTE PAIN OF LEFT SHOULDER: Primary | ICD-10-CM

## 2019-07-31 PROCEDURE — 97014 ELECTRIC STIMULATION THERAPY: CPT

## 2019-07-31 PROCEDURE — 97140 MANUAL THERAPY 1/> REGIONS: CPT

## 2019-07-31 PROCEDURE — 97110 THERAPEUTIC EXERCISES: CPT

## 2019-07-31 NOTE — PROGRESS NOTES
Daily Note     Today's date: 2019  Patient name: Silviano Espinosa  : 1960  MRN: [id number]  Referring provider: Veda Laura MD  Dx:   Encounter Diagnosis     ICD-10-CM    1  Acute pain of left shoulder M25 512        Start Time: 1750  Stop Time: 1850  Total time in clinic (min): 60 minutes    Subjective: Patient reports getting a little better, more movement  Objective: See treatment diary below      Assessment: Tolerated treatment well, prom rom improving FF, tightness with rotations luli IR  Patient exhibited good technique with therapeutic exercises and would benefit from continued PT      Plan: Continue per plan of care  Precautions: DM, HTN, hyopthyroid, prostate CA,    Modalities  7/8 7/10 7/15 7/17 7/22 7/24 7/31      MH/ES L  10 10' 10 10 10 10 10                                   Manual  7/8 7/10 7/15 7/17 7/22 7/24 7/31      Left shoulder 15' 15' 15' 15' 15 15 15                                                            Exercise Diary  7/8 7/10 7/15 7/17 7/22 7/24 7/31      Ball, FF circles nv  30x ea 30 30x 30x 30x      Cane FF 20x 10"  x20 10" 20x 10"x20 2# 30x 2#/30x 3/30      Cane ER 10x 10"  x15 10"  20x 10"x20 nt nt NT                   Pulleys 30x 30x 30x 30 30x 30x 30x      UBE nv Lvl 6  6 min 4' 4' 4' 5' 5'      Ladder nv 10"  x20 10" 20x 10"  20x 20x 20x 20x      RTC nt nv nv 2x10 red TB  20x red Blue  20x 25x      rows nv  50# 30x 50/30 50# 30x 50#  30x 50/30                                                                                    HEP 10'*                                                                  *Patient instructed in HEP for passive stretches to all left shoulder motions in various positions

## 2019-08-14 ENCOUNTER — OFFICE VISIT (OUTPATIENT)
Dept: PHYSICAL THERAPY | Age: 59
End: 2019-08-14
Payer: COMMERCIAL

## 2019-08-14 DIAGNOSIS — M25.512 ACUTE PAIN OF LEFT SHOULDER: Primary | ICD-10-CM

## 2019-08-14 PROCEDURE — 97110 THERAPEUTIC EXERCISES: CPT

## 2019-08-14 PROCEDURE — 97014 ELECTRIC STIMULATION THERAPY: CPT

## 2019-08-14 PROCEDURE — 97140 MANUAL THERAPY 1/> REGIONS: CPT

## 2019-08-14 NOTE — PROGRESS NOTES
Daily Note     Today's date: 2019  Patient name: Li Richardson  : 1960  MRN: [id number]  Referring provider: Jessica Acosta MD  Dx:   Encounter Diagnosis     ICD-10-CM    1  Acute pain of left shoulder M25 512        Start Time: 1800  Stop Time: 1900  Total time in clinic (min): 60 minutes    Subjective: Patient reports getting better, able to lift L arm higher  Objective: See treatment diary below      Assessment: Tolerated treatment well, ROM improving, achieved 158° FF PROM left shoulder  Continues to have tightness end ranges FF and rotations  Patient exhibited good technique with therapeutic exercises and would benefit from continued PT      Plan: Continue per plan of care  Precautions: DM, HTN, hyopthyroid, prostate CA,    Modalities  7/8 7/10 7/15 7/17 7/22 7/24 7/31 8/14     MH/ES L  10 10' 10 10 10 10 10 10                                  Manual  7/8 7/10 7/15 7/17 7/22 7/24 7/31 8/14     Left shoulder 15' 15' 15' 15' 15 15 15 15                                                           Exercise Diary  7/8 7/10 7/15 7/17 7/22 7/24 7/31 8/14     Ball, FF circles nv  30x ea 30 30x 30x 30x 30x     Cane FF 20x 10"  x20 10" 20x 10"x20 2# 30x 2#/30x 3/30 3/30     Cane ER 10x 10"  x15 10"  20x 10"x20 nt nt NT nt                  Pulleys 30x 30x 30x 30 30x 30x 30x 30x     UBE nv Lvl 6  6 min 4' 4' 4' 5' 5' 6'     Ladder nv 10"  x20 10" 20x 10"  20x 20x 20x 20x 10x     RTC nt nv nv 2x10 red TB  20x red Blue  20x 25x 30x      rows nv  50# 30x 50/30 50# 30x 50#  30x 50/30 50/30                                                                                   HEP 10'*                                                                  *Patient instructed in HEP for passive stretches to all left shoulder motions in various positions

## 2019-09-20 NOTE — PROGRESS NOTES
Patient has not returned follow up phone call or has not returned for further PT visits  D/c PT at this time

## 2022-01-07 ENCOUNTER — TELEPHONE (OUTPATIENT)
Dept: GASTROENTEROLOGY | Facility: CLINIC | Age: 62
End: 2022-01-07

## (undated) DEVICE — THE SINGLE USE BIOGUARD AIR WATER VALVE, OLYMPUS IS USED TO CONTROL THE AIR WATER FUNCTION OF AN ENDOSCOPE DURING GI ENDOSCOPIC PROCEDURES.: Brand: BIOGUARD

## (undated) DEVICE — TRANSPOSAL ULTRAFLEX DUO/QUAD ULTRA CART MANIFOLD

## (undated) DEVICE — THE AQUASHIELD SYSTEM IS INTENDED TO BE USED WITH AN AIR SOURCE FROM AN ENDOSCOPE WITH THE PURPOSE OF SUPPLYING STERILE WATER TO THE ENDOSCOPE DURING ENDOSCOPIC PROCEDURES.: Brand: AQUASHIELD

## (undated) DEVICE — THE TORRENT IRRIGATION TUBING IS INTENDED TO PROVIDE IRRIGATION VIA IRRIGATION FLUIDS, SUCH AS STERILE WATER, DURING GASTROINTESTINAL ENDOSCOPIC PROCEDURES WHEN USED IN CONJUNCTION WITH AN IRRIGATION PUMP OR ELECTROSURGICAL UNIT.: Brand: TORRENT

## (undated) DEVICE — THE TORRENT IRRIGATION SCOPE CONNECTOR IS USED WITH THE TORRENT IRRIGATION TUBING TO PROVIDE IRRIGATION FLUIDS SUCH AS STERILE WATER DURING GASTROINTESTINAL ENDOSCOPIC PROCEDURES WHEN USED IN CONJUNCTION WITH AN IRRIGATION PUMP (OR ELECTROSURGICAL UNIT).: Brand: TORRENT

## (undated) DEVICE — THE BIOSHIELD BIOPSY VALVE - STERILE IS USED TO COVER THE OPENING TO THE BIOPSY/SUCTION CHANNEL INLET OF A GASTROINTESTINAL ENDOSCOPE. IT PROVIDES ACCESS FOR ENDOSCOPIC DEVICE PASSAGE AND EXCHANGE, HELPS MAINTAIN INSUFFLATION AND MINIMIZES LEAKAGE OF BIOMATERIAL FROM THE BIOPSY PORT THROUGHOUT THE GASTROINTESTINAL ENDOSCOPIC PROCEDURE.: Brand: BIOSHIELD